# Patient Record
(demographics unavailable — no encounter records)

---

## 2025-06-04 NOTE — ASSESSMENT
[FreeTextEntry1] : ! ! Mr. Lou is a 67-year-old,  male with a past medical history significant for: (1) Obesity (2) Dyslipidemia (3) Osteoarthritis of right hip (4) Seasonal asthma with rare use of an inhaler (5) Gout (6) 25 hydroxy vitamin D deficiency (7) Depression (8) Hypertension and (8) Proteinuria first noted in his twenties at a time when he was not obese.  Prio workup: HIV (-), Hep B S Ag (-), WILLIAM (-), RF (-), RPR (-), SPEP (-), Urine immunofixation (-).  Laboratory studies (06/22/2021): BUN/creatinine 16/0.9, urine protein to creatinine ratio 0.5 g/g, albumin 4.3 g/dL.  Laboratory studies (11/20/2021): BUN/creatinine 14/1.0, SPEP normal pattern.  Laboratory studies (07/21/2022) BUN/creatinine 41/1.1, eGFR 76 mL per minute  Laboratory studies (11/14/2022) BUN/creatinine 13/1.0, eGFR 84 mL per minute, U/A --> trace intact blood, yellow, clear, protein > 3000 mg/dL, SG > 1.030, SEC 0-2, RBC 0-4, WBC 2-5  Laboratory studies (06/27/2023): BUN/creatinine 17/0.9, Urinalysis 300 mg/dL protein, SB > 1.030, trace intact blood (0-4 RBC 2-5 WBC)  Laboratory studies (05/14/2025) BUN/creatinine 17/0.97, eGFR 86 mL/min,  RENAL ULTRASOUND: R. 13.7 cm, L. 14.2 cm with lower pole cortical cyst measuring 2 cm and midpole cyst measuring 1.6 cm. (Liver is echogenic, spleen is borderline enlarged in size)  IMPRESSION:  (1) Obesity.  (2) Hypertension. Well controlled.   (3) Proteinuria. Mr. Lou underwent a renal biopsy in 2003 at [facility], the results of which demonstrated FSGS with glomerulomegaly, with increased mesangial matrix and partially effaced foot processes. The picture and clinical presentation supported obesity-related FSGS although the presence of proteinuria appeared to precede the obesity, raising the suspicion for an underlying disorder. Although I suspect that there is a component of the proteinuria related to the obesity +/- the sleep apnea, this does not explain the findings in Mr. Lou's twenties. On the other hand, I do not know how much protein was in his urine at that time. The prior urine protein to creatinine ratio was 1.5 grams per gram on 09/07/2024. The prior value was 0.5 g/g. The urinalysis of 09/20/2024 demonstrated 3200 mg/dL of proteinuria.  I do not have a recent UPCR.  (4) Diffuse hepatic steatosis. (MRI 10/2016). This is supported by an abdominal ultrasound.  (5) Hematuria. Mr. Lou had "one or two drops" of blood in his urine at the end of urination in the past. This event never recurred. Mr. Lou saw Dr. Federico Ocampo of urology.  RECOMMENDATIONS:  (1) Weight loss. We again talked about cutting out junk food, cutting back on portion size, and increasing physical activity. Mr. Lou lost 20 pounds over the past year.  He will go back on the regimen. (2) Vitamin D deficiency. No recent studies. This should be checked. (3) Continue ramipril 10 mg twice daily. (4) Check random urine for total protein and creatinine (5) Return in 6 months.

## 2025-06-04 NOTE — CONSULT LETTER
[Dear  ___] : Dear  [unfilled], [Consult Letter:] : I had the pleasure of evaluating your patient, [unfilled]. [Please see my note below.] : Please see my note below. [Consult Closing:] : Thank you very much for allowing me to participate in the care of this patient.  If you have any questions, please do not hesitate to contact me. [Sincerely,] : Sincerely, [FreeTextEntry3] : Yogi

## 2025-06-04 NOTE — HISTORY OF PRESENT ILLNESS
[FreeTextEntry1] : Gama Lou is a 67-year-old,  male who I saw a number of years back for proteinuria. He returned to me in June 2015.  Mr. Lou reports that he has had proteinuria since his twenties, at a time when he weighed 175 pounds. Mr. Lou  is here for follow up.  He continues to take ramipril 10 mg twice daily.  Mr. Lou is here for follow up.  He underwent some fluid removal from his knees in addition to corisol injections.  He had poison ivy and took a 10-day course of prednisone.  He continues to stay well hydrated and avoid salt. In addition, he has not had an alcoholic beverage in months.  He lost 20 pounds and gained 10 pounds.

## 2025-06-04 NOTE — REVIEW OF SYSTEMS
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

## 2025-06-25 NOTE — ASSESSMENT
[FreeTextEntry1] : Assessment Suspected Obstructive Sleep Apnea (OKSANA): High clinical suspicion based on STOP-BANG score of 8, [place] score of 12, loud snoring, witnessed apneas, and large neck circumference (20 inches).  Excessive Daytime Sleepiness: Likely secondary to suspected OKSANA and disrupted sleep.  Plan Diagnostic Testing: Ordered split-night polysomnography (PSG) to confirm OKSANA diagnosis and initiate CPAP titration if indicated during the study. Schedule follow-up to review results. Therapeutic Discussion: Discussed CPAP therapy as the primary treatment for OKSANA if confirmed on PSG. Reviewed benefits, usage, and mask options, considering patient's recliner sleeping position. Emphasized weight loss as an adjunct to improve OKSANA severity. Refer to nutritionist or weight management clinic for support if needed.  Schedule follow-up after PSG results to discuss treatment plan, including CPAP settings and adherence if initiated. Educated patient on risks of untreated OKSANA (e.g., cardiovascular complications, worsening renal function).

## 2025-06-25 NOTE — HISTORY OF PRESENT ILLNESS
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  Patient is sleepy with [place] sleepiness score of 12.  Patient has very loud snoring which disturbs his wife, also has witnessed apneas.  Patient's bedtime is around midnight wakes up in the morning around 7 AM. Patients sleep is disrupted.   He feels tired when he wakes up.  Patient drinks occasional coffee. Patient does not have any headaches or nocturia. He is not sleepy while driving. He sleeps in a recliner chair due to orthopedic issues. STOPBANG score - 8 neck size - 20 inches

## 2025-06-25 NOTE — PHYSICAL EXAM
[IV] : IV [General Appearance - Well Developed] : well developed [General Appearance - Well Nourished] : well nourished [Heart Sounds] : normal S1 and S2 [Murmurs] : no murmurs [] : no respiratory distress [Auscultation Breath Sounds / Voice Sounds] : lungs were clear to auscultation bilaterally [No Focal Deficits] : no focal deficits [Oriented To Time, Place, And Person] : oriented to person, place, and time [Memory Recent] : recent memory was not impaired